# Patient Record
Sex: FEMALE | Race: WHITE
[De-identification: names, ages, dates, MRNs, and addresses within clinical notes are randomized per-mention and may not be internally consistent; named-entity substitution may affect disease eponyms.]

---

## 2017-03-25 ENCOUNTER — HOSPITAL ENCOUNTER (EMERGENCY)
Dept: HOSPITAL 10 - FTE | Age: 52
LOS: 1 days | Discharge: HOME | End: 2017-03-26
Payer: COMMERCIAL

## 2017-03-25 VITALS
BODY MASS INDEX: 40.79 KG/M2 | WEIGHT: 216.05 LBS | BODY MASS INDEX: 40.79 KG/M2 | HEIGHT: 61 IN | HEIGHT: 61 IN | WEIGHT: 216.05 LBS

## 2017-03-25 DIAGNOSIS — M25.561: Primary | ICD-10-CM

## 2017-03-25 DIAGNOSIS — I10: ICD-10-CM

## 2017-03-25 DIAGNOSIS — Z96.651: ICD-10-CM

## 2017-03-25 DIAGNOSIS — R11.2: ICD-10-CM

## 2017-03-25 PROCEDURE — 81003 URINALYSIS AUTO W/O SCOPE: CPT

## 2017-03-25 PROCEDURE — 96372 THER/PROPH/DIAG INJ SC/IM: CPT

## 2017-03-26 VITALS — HEART RATE: 80 BPM | SYSTOLIC BLOOD PRESSURE: 123 MMHG | DIASTOLIC BLOOD PRESSURE: 78 MMHG | TEMPERATURE: 98.6 F

## 2017-03-26 LAB — URINE BLOOD (DIP) POC: (no result)

## 2017-03-26 NOTE — ERD
ER Documentation


Chief Complaint


Date/Time


DATE: 3/26/17 


TIME: 02:36


Chief Complaint


pain on right knee, lower back x 1 day. also c/o cough





HPI


Pleasant 51-year-old female presenting to emergency department with her 2 

daughters for translation.  Patient reports cold symptoms with vomiting 3 

days.  Patient reports generalized abdominal pain. She has vomited 5 times last 

emesis 1400.  Patient has been vomiting hard enough to cause low back pain.  

Patient states pain is on both sides of her back, denies any injury.  Patient 

has a secondary complaint unrelated, chronic right knee pain status post total 

knee replacement last year.  Patient has been seen by her primary care 

physician for this complaint.  Reports that she has not had this type of pain 

since before surgery.  Denies any new injury.  Pain is greater than 5/10 on 

pain scale.


Patient denies fever, chills, dysuria, or diarrhea





ROS


All systems reviewed and are negative except as per history of present illness.





Medications


Home Meds


Active Scripts


Hydrocodone/Acetaminophen (Norco 5-325 Tablet) 1 Each Tablet, 1 TAB PO Q6H Y 

for PAIN, #7 TAB


   Prov:CRISTINA,CEE         3/26/17


Ondansetron (Ondansetron Odt) 4 Mg Tab.rapdis, 4 MG PO Q6H Y for NAUSEA AND/OR 

VOMITING, #10 TAB


   Prov:CRISTINA,CEE         3/26/17


Ibuprofen* (Motrin*) 800 Mg Tab, 800 MG PO Q8, #30 TAB 0 Refills


   Prov:SALLIE MAGANA PA-C         4/15/16


Reported Medications


Lisinopril-Hydrochlorothiazide (Lisinopril-HCTZ) 20-12.5 Mg Tab, 1 TAB PO DAILY

, #30 TAB


   16





Allergies


Allergies:  


Coded Allergies:  


     No Known Allergies (Verified  Allergy, Mild, 3/25/17)





PMhx/Soc


History of Surgery:  Yes (LT+ RT TKR, )


Anesthesia Reaction:  No


Hx Neurological Disorder:  No


Hx Respiratory Disorders:  No


Hx Cardiac Disorders:  Yes (HTN, HIGH CHOLESTEROL)


Hx Psychiatric Problems:  No


Hx Miscellaneous Medical Probl:  Yes (OA, HTN)


Hx Alcohol Use:  No


Hx Substance Use:  No


Hx Tobacco Use:  No


Smoking Status:  Never smoker





Physical Exam


Vitals





Vital Signs








  Date Time  Temp Pulse Resp B/P Pulse Ox O2 Delivery O2 Flow Rate FiO2


 


3/26/17 03:01 98.6 80  123/78 96 Room Air  


 


3/25/17 21:34 100.0 91 20 126/78 96   





Vitals stable, triage note reviewed


Physical Exam


Const:     No acute distress


Head:   Atraumatic 


Eyes:    Normal Conjunctiva PERRLA, EOMI


ENT:    Normal External Ears, Nose and Mouth.  Mucous membranes moist


Neck:               Neck is supple.  Full range of motion..~ No meningismus.


Resp:   Chest rise and fall symmetrically clear to auscultation bilaterally, no 

rales wheezes or rhonchi


Cardio:   Regular rate and rhythm, no murmurs


Abd:    Soft, non tender, no epigastric pain, no periumbilical pain, no Mcwilliams'

s sign or McBurney point tenderness, abdomen is non distended, obese with 

normal bowel sounds, no CVA tenderness


Skin:    No petechiae or rashes


Back:    No midline or flank tenderness


Ext:    Lower Extremity -right knee


 Skin:                     No laceration, healed midline scar from total knee 

replacement


 Compartments:      Soft


 Motor:                   Full active range of motion hip/knee


 Sensation:             Intact to light touch 


 Bones:                 Generalized tenderness around patella


 Joints:                    No effusion or laxity


 Pulses/Perfusion:     2+ DP, Capillary refill < 2 seconds


Neur:    Awake and alert


Psych:    Normal Mood and Affect


Results 24 hrs





 Laboratory Tests








Test


  3/26/17


01:24


 


Bedside Urine pH (LAB) 6.0 


 


Bedside Urine Protein (LAB) 1+ 


 


Bedside Urine Glucose (UA) Negative 


 


Bedside Urine Ketones (LAB) Trace 


 


Bedside Urine Blood 2+ 


 


Bedside Urine Nitrite (LAB) Negative 


 


Bedside Urine Leukocyte


Esterase (L Negative 


 








 Current Medications








 Medications


  (Trade)  Dose


 Ordered  Sig/Severo


 Route


 PRN Reason  Start Time


 Stop Time Status Last Admin


Dose Admin


 


 Ondansetron HCl


  (Zofran Odt)  4 mg  ONCE  STAT


 ODT


   3/26/17 01:08


 3/26/17 01:12 DC 3/26/17 01:30


 


 


 Ketorolac


 Tromethamine


  (Toradol)  15 mg  ONCE  STAT


 IM


   3/26/17 01:08


 3/26/17 01:12 DC 3/26/17 01:31


 


 


 Diazepam


  (Valium)  5 mg  ONCE  ONCE


 PO


   3/26/17 01:30


 3/26/17 01:31 DC 3/26/17 01:30


 





Microscopic hematuria noted.  Proteinuria noted.  Patient will follow-up with 

primary physician for further evaluation





Departure


Diagnosis:  


 Primary Impression:  


 Knee pain


 Laterality:  right  Chronicity:  chronic  Qualified Code:  M25.561 - Chronic 

pain of right knee


 Additional Impression:  


 Vomiting


 Vomiting type:  unspecified  Vomiting Intractability:  non-intractable  Nausea 

presence:  with nausea  Qualified Code:  R11.2 - Non-intractable vomiting with 

nausea, unspecified vomiting type


Condition:  Good


Patient Instructions:  Nausea and Vomiting-Adult, Osteoarthritis


Referrals:  


[facility]





Additional Instructions:  


Thank you for for coming to [facility] for your care today. 

Please ask your nurse or provider if you have questions about your care today 

and do not leave until all your questions have been answered.  Please use any 

medications given as directed and follow-up with your doctor (or the doctor you 

were referred to) in the next 2-3 days. If you do not have a primary care 

doctor you may follow up at the [facility] (listed below). You may also 

use motrin and tylenol as needed for fever and/or pain unless instructed 

otherwise by your provider or nurse. Indications for more urgent follow-up have 

been discussed, but you may return to the Emergency Department at ANY time for 

any worrisome or worsening symptoms.





If you have abdominal pain, please know that no test or exam you received is 

perfect and you should follow up within 8 hours for continued pain.





If you had any imaging studies today, such as an X-Ray or CT Scan, these 

studies will be reviewed later by a radiologist. You will be called if there 

are important findings that were not identified today, so make sure the contact 

information you provided at registration is correct.





If you received any narcotic pain control medicine today, such as Vicodin, 

Morphine or Dilaudid, your coordination and judgment may be affected for a 

number of hours. Please do not drive or operate heavy machinery, and you may 

want someone to assist you at home. If you were given a prescription for 

narcotic medication, be aware that it is very addictive- use sparingly and only 

if necessary.











CEE EVANS Mar 26, 2017 02:46

## 2017-08-31 ENCOUNTER — HOSPITAL ENCOUNTER (EMERGENCY)
Dept: HOSPITAL 10 - FTE | Age: 52
Discharge: HOME | End: 2017-08-31
Payer: COMMERCIAL

## 2017-08-31 VITALS
HEIGHT: 62 IN | WEIGHT: 187.39 LBS | WEIGHT: 187.39 LBS | BODY MASS INDEX: 34.48 KG/M2 | HEIGHT: 62 IN | BODY MASS INDEX: 34.48 KG/M2

## 2017-08-31 DIAGNOSIS — Z96.652: ICD-10-CM

## 2017-08-31 DIAGNOSIS — R21: Primary | ICD-10-CM

## 2017-08-31 DIAGNOSIS — Z96.651: ICD-10-CM

## 2017-08-31 DIAGNOSIS — I10: ICD-10-CM

## 2017-08-31 PROCEDURE — 99283 EMERGENCY DEPT VISIT LOW MDM: CPT

## 2017-08-31 NOTE — ERD
ER Documentation


Chief Complaint


Date/Time


DATE: 17 


TIME: 09:39


Chief Complaint


Generalized rash x 1 week





HPI


52-year-old female comes in with generalized pruritic rash that started 

yesterday after eating ceviche shrimp.  She states that she has had diffuse 

itching that goes on and off, mostly on her trunk.  She has no trouble 

swallowing, voice changes.  She has not had any other foods, medications, 

lotions or creams or allergens that she can think of.  She is no trouble 

breathing, chest pain.  She also reports left-sided jaw swelling and 

tenderness.  She has not had any fevers, ear pain, sore throat, rhinorrhea or 

cough with this.





ROS


All systems reviewed and are negative except as per history of present illness.





Medications


Home Meds


Active Scripts


Ibuprofen* (Motrin*) 400 Mg Tab, 400 MG PO Q6, #20 TAB


   Prov:CATHIE VERDE PA-C         17


Diphenhydramine Hcl* (Benadryl*) 25 Mg Cap, 25 MG PO Q6H Y for ITCHING, #30 CAP


   Prov:CATHIE VERDE PA-C         17


Methylprednisolone* (Medrol* DOSE PACK) 4 Mg/Dose-Pack Tab.ds.pk, 4 MG PO .AS 

DIRECTED, #1 PACKET


   Prov:CATHIE VERDE PA-C         17


Hydrocodone/Acetaminophen (Norco 5-325 Tablet) 1 Each Tablet, 1 TAB PO Q6H Y 

for PAIN, #7 TAB


   Prov:CRISTINA,CEE         3/26/17


Ondansetron (Ondansetron Odt) 4 Mg Tab.rapdis, 4 MG PO Q6H Y for NAUSEA AND/OR 

VOMITING, #10 TAB


   Prov:CRISTINA,CEE         3/26/17


Ibuprofen* (Motrin*) 800 Mg Tab, 800 MG PO Q8, #30 TAB 0 Refills


   Prov:SALLIE MAGANA PA-C         4/15/16


Reported Medications


Lisinopril-Hydrochlorothiazide (Lisinopril-HCTZ) 20-12.5 Mg Tab, 1 TAB PO DAILY

, #30 TAB


   16





Allergies


Allergies:  


Coded Allergies:  


     No Known Allergies (Verified  Allergy, Mild, 3/25/17)





PMhx/Soc


History of Surgery:  Yes (LT+ RT TKR, )


Anesthesia Reaction:  No


Hx Neurological Disorder:  No


Hx Respiratory Disorders:  No


Hx Cardiac Disorders:  Yes (HTN, HIGH CHOLESTEROL)


Hx Psychiatric Problems:  No


Hx Miscellaneous Medical Probl:  Yes (OA, HTN)


Hx Alcohol Use:  No


Hx Substance Use:  No


Hx Tobacco Use:  No


Smoking Status:  Never smoker





Physical Exam


Vitals





Vital Signs








  Date Time  Temp Pulse Resp B/P Pulse Ox O2 Delivery O2 Flow Rate FiO2


 


17 08:31 97.9 73 20 145/86 99   








Physical Exam


General: Well-developed, well-nourished.  The patient appears in no acute 

distress.


HEENT: Head is normocephalic, atraumatic. No scleral icterus.  Pupils are equal

, round, and reactive. Oral mucous membranes are moist.  No pharyngeal 

erythema.No angioedema. Preauricular lymph node swelling on the left side 


Neck: Supple.  Nontender.


Lungs: Clear to auscultation.  Normal air movement.


Heart: Regular rate and rhythm.  S1 and S2 are normal.  No murmurs, gallops, or 

rubs.


Abdomen: Soft, nontender, nondistended.  Bowel sounds are normoactive.


Extremities: No clubbing or cyanosis.  Normal pulses. Moving extremities x 4. 

No weakness.


Neurologic: Alert and oriented 3.  No focal deficits.


Skin:Generalized macular rash that is erythematous, blanchable on the trunk


Results 24 hrs





 Current Medications








 Medications


  (Trade)  Dose


 Ordered  Sig/Seevro


 Route


 PRN Reason  Start Time


 Stop Time Status Last Admin


Dose Admin


 


 Diphenhydramine


 HCl


  (Benadryl)  25 mg  ONCE  ONCE


 PO


   17 09:00


 17 09:01 DC 17 08:57


 


 


 Prednisone


  (Prednisone)  40 mg  ONCE  ONCE


 PO


   17 09:00


 17 09:01 DC 17 08:58


 











Procedures/MDM


52-year-old female comes in with a generalized rash that appears to be pruritic

, intermittent, likely allergic reaction.  She does not show any signs of 

severe reaction, anaphylaxis, bacterial infection, Mononucleosis.  She also has 

lymphadenopathy on the left side that is preauricular, there are no signs of 

any infection, trismus, abscess.Patient's allergic symptoms have stabilized 

while they have been evaluated in the department without evidence of persistent 

systemic reaction. 


Patient is healthy and capable of treating and responding to rebound reactions.


Patient appropriate for outpatient allergy work up and treatment.





Departure


Diagnosis:  


 Primary Impression:  


 Rash


Condition:  Good


Patient Instructions:  Allergic Reaction, Other (General)











CATHIE VERDE PA-C Aug 31, 2017 09:43

## 2018-03-15 ENCOUNTER — HOSPITAL ENCOUNTER (EMERGENCY)
Dept: HOSPITAL 91 - E/R | Age: 53
Discharge: HOME | End: 2018-03-15
Payer: COMMERCIAL

## 2018-03-15 ENCOUNTER — HOSPITAL ENCOUNTER (EMERGENCY)
Age: 53
Discharge: HOME | End: 2018-03-15

## 2018-03-15 DIAGNOSIS — I10: ICD-10-CM

## 2018-03-15 DIAGNOSIS — E03.9: ICD-10-CM

## 2018-03-15 DIAGNOSIS — K80.70: Primary | ICD-10-CM

## 2018-03-15 LAB
ADD MAN DIFF?: NO
ADD UMIC: YES
ALANINE AMINOTRANSFERASE: 26 IU/L (ref 13–69)
ALBUMIN/GLOBULIN RATIO: 1.22
ALBUMIN: 4.3 G/DL (ref 3.3–4.9)
ALKALINE PHOSPHATASE: 118 IU/L (ref 42–121)
ANION GAP: 18 (ref 8–16)
ASPARTATE AMINO TRANSFERASE: 24 IU/L (ref 15–46)
BASOPHIL #: 0 10^3/UL (ref 0–0.1)
BASOPHILS %: 0.5 % (ref 0–2)
BILIRUBIN,DIRECT: 0 MG/DL (ref 0–0.2)
BILIRUBIN,TOTAL: 0.3 MG/DL (ref 0.2–1.3)
BLOOD UREA NITROGEN: 12 MG/DL (ref 7–20)
CALCIUM: 8.8 MG/DL (ref 8.4–10.2)
CARBON DIOXIDE: 22 MMOL/L (ref 21–31)
CHLORIDE: 104 MMOL/L (ref 97–110)
CREATININE: 0.73 MG/DL (ref 0.44–1)
EOSINOPHILS #: 0.1 10^3/UL (ref 0–0.5)
EOSINOPHILS %: 1.4 % (ref 0–7)
GLOBULIN: 3.5 G/DL (ref 1.3–3.2)
GLUCOSE: 91 MG/DL (ref 70–220)
HEMATOCRIT: 41.6 % (ref 37–47)
HEMOGLOBIN: 14.5 G/DL (ref 12–16)
LIPASE: 129 U/L (ref 23–300)
LYMPHOCYTES #: 0.8 10^3/UL (ref 0.8–2.9)
LYMPHOCYTES %: 17.8 % (ref 15–51)
MEAN CORPUSCULAR HEMOGLOBIN: 31.3 PG (ref 29–33)
MEAN CORPUSCULAR HGB CONC: 34.9 G/DL (ref 32–37)
MEAN CORPUSCULAR VOLUME: 89.7 FL (ref 82–101)
MEAN PLATELET VOLUME: 9.9 FL (ref 7.4–10.4)
MONOCYTE #: 0.2 10^3/UL (ref 0.3–0.9)
MONOCYTES %: 4.9 % (ref 0–11)
NEUTROPHIL #: 3.3 10^3/UL (ref 1.6–7.5)
NEUTROPHILS %: 75.4 % (ref 39–77)
NUCLEATED RED BLOOD CELLS #: 0 10^3/UL (ref 0–0)
NUCLEATED RED BLOOD CELLS%: 0 /100WBC (ref 0–0)
PLATELET COUNT: 197 10^3/UL (ref 140–415)
POTASSIUM: 3.8 MMOL/L (ref 3.5–5.1)
RED BLOOD COUNT: 4.64 10^6/UL (ref 4.2–5.4)
RED CELL DISTRIBUTION WIDTH: 13 % (ref 11.5–14.5)
SODIUM: 140 MMOL/L (ref 135–144)
TOTAL PROTEIN: 7.8 G/DL (ref 6.1–8.1)
TROPONIN-I: < 0.012 NG/ML (ref 0–0.12)
UR ASCORBIC ACID: NEGATIVE MG/DL
UR BACTERIA: (no result) /HPF
UR BILIRUBIN (DIP): NEGATIVE MG/DL
UR BLOOD (DIP): (no result) MG/DL
UR CLARITY: CLEAR
UR COLOR: YELLOW
UR GLUCOSE (DIP): NEGATIVE MG/DL
UR KETONES (DIP): NEGATIVE MG/DL
UR LEUKOCYTE ESTERASE (DIP): NEGATIVE LEU/UL
UR MUCUS: (no result) /HPF
UR NITRITE (DIP): NEGATIVE MG/DL
UR PH (DIP): 6 (ref 5–9)
UR RBC: 5 /HPF (ref 0–5)
UR SPECIFIC GRAVITY (DIP): 1.01 (ref 1–1.03)
UR SQUAMOUS EPITHELIAL CELL: (no result) /HPF
UR TOTAL PROTEIN (DIP): NEGATIVE MG/DL
UR UROBILINOGEN (DIP): (no result) MG/DL
UR WBC: 1 /HPF (ref 0–5)
URINE BLOOD (DIP) POC: (no result)
URINE PH (DIP) POC: 6 (ref 5–8.5)
WHITE BLOOD COUNT: 4.3 10^3/UL (ref 4.8–10.8)

## 2018-03-15 PROCEDURE — 81003 URINALYSIS AUTO W/O SCOPE: CPT

## 2018-03-15 PROCEDURE — 36415 COLL VENOUS BLD VENIPUNCTURE: CPT

## 2018-03-15 PROCEDURE — 99285 EMERGENCY DEPT VISIT HI MDM: CPT

## 2018-03-15 PROCEDURE — 80053 COMPREHEN METABOLIC PANEL: CPT

## 2018-03-15 PROCEDURE — 71045 X-RAY EXAM CHEST 1 VIEW: CPT

## 2018-03-15 PROCEDURE — 84484 ASSAY OF TROPONIN QUANT: CPT

## 2018-03-15 PROCEDURE — 83690 ASSAY OF LIPASE: CPT

## 2018-03-15 PROCEDURE — 96375 TX/PRO/DX INJ NEW DRUG ADDON: CPT

## 2018-03-15 PROCEDURE — 93005 ELECTROCARDIOGRAM TRACING: CPT

## 2018-03-15 PROCEDURE — 85025 COMPLETE CBC W/AUTO DIFF WBC: CPT

## 2018-03-15 PROCEDURE — 81001 URINALYSIS AUTO W/SCOPE: CPT

## 2018-03-15 PROCEDURE — 96374 THER/PROPH/DIAG INJ IV PUSH: CPT

## 2018-03-15 PROCEDURE — 76705 ECHO EXAM OF ABDOMEN: CPT

## 2018-03-15 RX ADMIN — FAMOTIDINE 1 MG: 10 INJECTION, SOLUTION INTRAVENOUS at 14:27

## 2018-03-15 RX ADMIN — ALUMINUM HYDROXIDE, MAGNESIUM HYDROXIDE, DIMETHICONE 1 ML: 200; 200; 20 SUSPENSION ORAL at 14:26

## 2018-03-15 RX ADMIN — LIDOCAINE HYDROCHLORIDE 1 MLS/HR: 10 INJECTION, SOLUTION EPIDURAL; INFILTRATION; INTRACAUDAL; PERINEURAL at 14:36

## 2018-03-15 RX ADMIN — ONDANSETRON HYDROCHLORIDE 1 MG: 2 INJECTION, SOLUTION INTRAMUSCULAR; INTRAVENOUS at 14:26

## 2018-06-08 ENCOUNTER — HOSPITAL ENCOUNTER (OUTPATIENT)
Dept: HOSPITAL 91 - GIL | Age: 53
Discharge: HOME | End: 2018-06-08
Payer: COMMERCIAL

## 2018-06-08 ENCOUNTER — HOSPITAL ENCOUNTER (OUTPATIENT)
Age: 53
Discharge: HOME | End: 2018-06-08

## 2018-06-08 DIAGNOSIS — K92.1: Primary | ICD-10-CM

## 2018-06-08 DIAGNOSIS — D12.3: ICD-10-CM

## 2018-06-08 DIAGNOSIS — K64.8: ICD-10-CM

## 2018-06-08 DIAGNOSIS — J44.9: ICD-10-CM

## 2018-06-08 DIAGNOSIS — K29.50: ICD-10-CM

## 2018-06-08 DIAGNOSIS — E66.9: ICD-10-CM

## 2018-06-08 PROCEDURE — 43239 EGD BIOPSY SINGLE/MULTIPLE: CPT

## 2018-06-08 PROCEDURE — 88305 TISSUE EXAM BY PATHOLOGIST: CPT

## 2018-06-08 PROCEDURE — 88312 SPECIAL STAINS GROUP 1: CPT

## 2018-08-20 ENCOUNTER — HOSPITAL ENCOUNTER (EMERGENCY)
Age: 53
Discharge: HOME | End: 2018-08-20

## 2018-08-20 ENCOUNTER — HOSPITAL ENCOUNTER (EMERGENCY)
Dept: HOSPITAL 91 - FTE | Age: 53
Discharge: HOME | End: 2018-08-20
Payer: COMMERCIAL

## 2018-08-20 DIAGNOSIS — M25.561: Primary | ICD-10-CM

## 2018-08-20 DIAGNOSIS — Z96.653: ICD-10-CM

## 2018-08-20 PROCEDURE — 73562 X-RAY EXAM OF KNEE 3: CPT

## 2018-08-20 PROCEDURE — 99284 EMERGENCY DEPT VISIT MOD MDM: CPT

## 2018-08-20 PROCEDURE — 96372 THER/PROPH/DIAG INJ SC/IM: CPT

## 2018-08-20 RX ADMIN — KETOROLAC TROMETHAMINE 1 MG: 30 INJECTION, SOLUTION INTRAMUSCULAR at 13:39

## 2019-07-12 ENCOUNTER — HOSPITAL ENCOUNTER (EMERGENCY)
Dept: HOSPITAL 10 - FTE | Age: 54
Discharge: HOME | End: 2019-07-12
Payer: COMMERCIAL

## 2019-07-12 ENCOUNTER — HOSPITAL ENCOUNTER (EMERGENCY)
Dept: HOSPITAL 91 - FTE | Age: 54
Discharge: HOME | End: 2019-07-12
Payer: COMMERCIAL

## 2019-07-12 VITALS — SYSTOLIC BLOOD PRESSURE: 162 MMHG | RESPIRATION RATE: 20 BRPM | HEART RATE: 98 BPM | DIASTOLIC BLOOD PRESSURE: 74 MMHG

## 2019-07-12 VITALS
BODY MASS INDEX: 40.65 KG/M2 | HEIGHT: 64 IN | WEIGHT: 238.1 LBS | BODY MASS INDEX: 40.65 KG/M2 | WEIGHT: 238.1 LBS | HEIGHT: 64 IN

## 2019-07-12 DIAGNOSIS — Z96.653: ICD-10-CM

## 2019-07-12 DIAGNOSIS — J40: Primary | ICD-10-CM

## 2019-07-12 PROCEDURE — 71045 X-RAY EXAM CHEST 1 VIEW: CPT

## 2019-07-12 PROCEDURE — 99283 EMERGENCY DEPT VISIT LOW MDM: CPT

## 2019-07-12 PROCEDURE — 94664 DEMO&/EVAL PT USE INHALER: CPT

## 2019-07-12 RX ADMIN — ALBUTEROL SULFATE 1 MG: 2.5 SOLUTION RESPIRATORY (INHALATION) at 11:21

## 2019-07-12 RX ADMIN — DEXAMETHASONE SODIUM PHOSPHATE 1 MG: 10 INJECTION, SOLUTION INTRAMUSCULAR; INTRAVENOUS at 11:02

## 2019-07-12 RX ADMIN — IPRATROPIUM BROMIDE 1 MG: 0.5 SOLUTION RESPIRATORY (INHALATION) at 11:21

## 2019-07-12 RX ADMIN — ACETAMINOPHEN 1 MG: 325 TABLET, FILM COATED ORAL at 11:03

## 2019-07-12 NOTE — ERD
ER Documentation


Chief Complaint


Chief Complaint





pt is bib self with c/o cough x 3 wks, slight fever





HPI


54-year-old female is here complaining of dry cough for the past 3 weeks as well


as mild fever.  He also states she gets chills at night and has woke up sweaty a


couple times.  No palpitations.  She is been taking anti-inflammatories and 


Robitussin at home with minimal relief.  No hemoptysis or unplanned weight loss.





ROS


All systems reviewed and are negative except as per history of present illness.





Medications


Home Meds


Active Scripts


Azithromycin* (Zithromax*) 250 Mg Tablet, 250 MG PO .ZPACK AS DIRECTED, #6 TAB


   TAKE 500 MG (2 TABS) THE FIRST DAY THEN 250 MG (1 TAB) DAYS 2-5


   Prov:KANNAN BLUNT PA-C         7/12/19


Naproxen* (Naprosyn*) 500 Mg Tablet, 500 MG PO BID PRN for PAIN AND/OR 


INFLAMMATION, #30 TAB


   Prov:FRANKO JOSÉ PA-C         8/20/18


Famotidine* (Pepcid*) 20 Mg Tablet, 20 MG PO BID for 14 Days, TAB


   Prov:AROLDO MIRELES MD         3/15/18


Reported Medications


[Stomach Med]   No Conflict Check, PO DAILY


   6/8/18


Lisinopril* (Lisinopril*) 20 Mg Tablet, 20 MG PO DAILY, #30 TAB


   3/15/18


Levothyroxine Sodium* (Levothyroxine Sodium*) 50 Mcg Tablet, 50 MCG PO BEFORE B


REAKFAST, #30 TAB


   3/15/18





Allergies


Allergies:  


Coded Allergies:  


     No Known Allergies (Verified  Allergy, Mild, 6/8/18)





PMhx/Soc


History of Surgery:  Yes (BILAT KNEE REPLACEMENT)


Anesthesia Reaction:  No


Hx Neurological Disorder:  No


Hx Respiratory Disorders:  No


Hx Cardiac Disorders:  No


Hx Psychiatric Problems:  No


Hx Miscellaneous Medical Probl:  No


Hx Alcohol Use:  No


Hx Substance Use:  No


Hx Tobacco Use:  No





FmHx


Family History:  No diabetes





Physical Exam


Vitals





Vital Signs


  Date      Temp   Pulse  Resp  B/P (MAP)   Pulse Ox  O2         O2 Flow    FiO2


Time                                                  Delivery   Rate


   7/12/19            89    20                    96                          21


     11:22


   7/12/19  100.6


     11:03


   7/12/19  100.5     98    20      162/74        96


     10:48                           (103)





Physical Exam


INITIAL VITAL SIGNS: Reviewed by me


GENERAL: Awake, alert and oriented x 4, well appearing, nontoxic, speaking in 


full sentences. No acute distress


HEAD: Atraumatic





NOSE: Normal nose.


THROAT: No tonilar erythema or edema. No exudates. Uvula midline. No kissing 


tonsils.


RESPIRATORY: Clear to auscultation bilaterally. Symmetric chest wall rise.  No 


wheezing or rales. No accessory muscle use.  


CV: Regular rate and rhythm. No murmurs, rubs, or gallops.


Results 24 hrs





Current Medications


 Medications
   Dose
          Sig/Severo
       Start Time
   Status  Last


 (Trade)       Ordered        Route
 PRN     Stop Time              Admin
Dose


                              Reason                                Admin


 Albuterol
     5 mg           ONCE  STAT
    7/12/19       DC           7/12/19


(Proventil
                   NEB
           10:57
                       11:21



0.083% (Neb))                                7/12/19 10:58


 Ipratropium
   0.5 mg         ONCE  STAT
    7/12/19       DC           7/12/19


Bromide
                      NEB
           10:57
                       11:21



(Atrovent                                    7/12/19 10:58


0.02%



(Neb))


                10 mg          ONCE  ONCE
    7/12/19       DC           7/12/19


Dexamethasone                 PO
            11:00
                       11:02




  (Decadron)                                7/12/19 11:01


                650 mg         ONCE  ONCE
    7/12/19       DC           7/12/19


Acetaminophen                 PO
            11:00
                       11:03




  (Tylenol                                  7/12/19 11:01


Tab)








Procedures/MDM


54-year-old female is here with cough for the past 3 weeks.  She does have a 


low-grade temperature and was given Tylenol.  No tachycardia.  I doubt sepsis.  


Chest x-ray is negative.  She was given Decadron and a breathing treatment here 


with improvement.  She is discharged with Z-Steven.  Patient counseled regarding my


diagnostic impression and care plan. Prior to discharge all questions answered. 


Pt agrees with treatment plan and understands strict return precautions. Pt is 


instructed to follow up with primary care provider within 24-48 hours. 


Precautionary instructions provided including instructions to return to the ER 


if not improving or for any worsening or changing symptoms or concerns.





Departure


Diagnosis:  


   Primary Impression:  


   Bronchitis


Condition:  Stable


Patient Instructions:  Bronchitis, Antiobiotic Treatment (Adult)





Additional Instructions:  


Llame al doctor ALONZO y beto jocelyn PUNEET PARA DENTRO DE 1-2 KOTHARI.Dgale a la 


secretaria que nosotros le instruimos hacer esta puneet.Avise o llame si hernandez 


condicin se empeora antes de la puneet. Regresa aqui si peor o no mejor.











KANNAN BLUNT PA-C            Jul 12, 2019 12:00

## 2019-08-13 ENCOUNTER — HOSPITAL ENCOUNTER (EMERGENCY)
Dept: HOSPITAL 10 - E/R | Age: 54
Discharge: HOME | End: 2019-08-13
Payer: COMMERCIAL

## 2019-08-13 ENCOUNTER — HOSPITAL ENCOUNTER (EMERGENCY)
Dept: HOSPITAL 91 - E/R | Age: 54
Discharge: HOME | End: 2019-08-13
Payer: COMMERCIAL

## 2019-08-13 VITALS
WEIGHT: 230.82 LBS | BODY MASS INDEX: 40.9 KG/M2 | BODY MASS INDEX: 40.9 KG/M2 | HEIGHT: 63 IN | HEIGHT: 63 IN | WEIGHT: 230.82 LBS

## 2019-08-13 VITALS — RESPIRATION RATE: 18 BRPM | SYSTOLIC BLOOD PRESSURE: 165 MMHG | HEART RATE: 74 BPM | DIASTOLIC BLOOD PRESSURE: 99 MMHG

## 2019-08-13 DIAGNOSIS — Z96.653: ICD-10-CM

## 2019-08-13 DIAGNOSIS — M54.2: Primary | ICD-10-CM

## 2019-08-13 PROCEDURE — 72040 X-RAY EXAM NECK SPINE 2-3 VW: CPT

## 2019-08-13 PROCEDURE — 99283 EMERGENCY DEPT VISIT LOW MDM: CPT
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
